# Patient Record
(demographics unavailable — no encounter records)

---

## 2024-10-30 NOTE — ASSESSMENT
[FreeTextEntry1] : Venipuncture done in our office, Labs sent out.Patient prescribed Augmentin X 10 days/Astelin/cont flonase, consider ENT eval due to recurrent issue  .Patient advised to rest/increase fluids and use supportive therapy. Patient will call if symptoms persist or worsen and return to the office as scheduled for regular followup.   Dr. Diaz was present in office building while I examined patient

## 2024-10-30 NOTE — ASSESSMENT
[FreeTextEntry1] : Patient with known right renal artery aneurysm. Currently asymptomatic. Aneurysm appears to be stable today and unremarkable, measuring around 1.8cm. Explained to the patient that it is imaging dependence and essentially is about the same size as it was in 2018. He does not require intervention at this time.   We will continue with yearly surveillance.

## 2024-10-30 NOTE — HISTORY OF PRESENT ILLNESS
[FreeTextEntry1] : 70-year-old gentleman with known right renal artery aneurysm. I personally reviewed imaging beginning back to 2018 and his aneurysm has been been around 2 cm since. Patient is currently asymptomatic. She is here for yearly surveillance duplex. Pt denies any new complains/pain.

## 2024-10-30 NOTE — PHYSICAL EXAM
[No Acute Distress] : no acute distress [No Respiratory Distress] : no respiratory distress  [Clear to Auscultation] : lungs were clear to auscultation bilaterally [Normal Rate] : normal rate  [Regular Rhythm] : with a regular rhythm [Normal Affect] : the affect was normal [Normal Mood] : the mood was normal [Supple] : supple [de-identified] : Tm's with +fluid, +nasal congestion, +PND [de-identified] : +maxillary pressure

## 2024-10-30 NOTE — HISTORY OF PRESENT ILLNESS
[FreeTextEntry8] : Pt presents for  1.Repeat K, last level was 3.3, abnormal results d/w pt and questions answered  2.Acute on chronic sinus issue, today is terrible, "face hurts", pt c/o sinus pressure, congestion, PND with reactive cough from PND. Denies fever/covid exposure

## 2024-10-30 NOTE — PHYSICAL EXAM
[Abdomen Masses] : No abdominal masses [Abdomen Tenderness] : ~T ~M No abdominal tenderness [de-identified] : Appears well, No distress [de-identified] : Full ROM Upper/Lower Extremities. No evidence of edema

## 2025-01-07 NOTE — HISTORY OF PRESENT ILLNESS
[FreeTextEntry1] : chest pain, and uncontrolled hypertension hypertension and chest pain , aortic aneurysm   HPI for today: :  1 7 2025:   feels good. no chest pain  . no dyspnea on exertion .  no dizizness. no syncope.  compliant wiht meds    old note:  he started taking ubiquinol and his TGs went down.  no chest pain . no dyspnea on exertion . no dizziness.  no dyspnea on exertion  complaitn with med.s   ol dnote:   feels good. no chest pain no syncope,.   +  dizziness  no chest pain , no dyspnea on exertion .    old note:  feels good ,. no chest pain. no headhaces. no dizziness.  feels ./deras after wgoing up 4-5 flights of stairs.    old note:  he feels good. dyspnea on exertion : chronic.  he  follows up with vascular. no syncope.     old note: : : feels ok. no headaches. no dizziness. no headaches. no dizziness. no cehst pain. no headacehs.   old note:  feels good. no chest pain, no headahces. no dyspnea.    old note: feels good. No chest pain. no dyspnea. no headaches. no plaptiations. no dizziness.    old note: complains of dyspnea on exertion. worse when he is moving fast or lifting stuff Some flutterin gof the chest . intermittent.  No chest pain. no dizziness. No syncope.

## 2025-01-07 NOTE — PHYSICAL EXAM
[General Appearance - Well Developed] : well developed [Normal Appearance] : normal appearance [Well Groomed] : well groomed [General Appearance - Well Nourished] : well nourished [No Deformities] : no deformities [General Appearance - In No Acute Distress] : no acute distress [Normal Conjunctiva] : the conjunctiva exhibited no abnormalities [Eyelids - No Xanthelasma] : the eyelids demonstrated no xanthelasmas [Normal Oral Mucosa] : normal oral mucosa [No Oral Pallor] : no oral pallor [No Oral Cyanosis] : no oral cyanosis [Normal Jugular Venous A Waves Present] : normal jugular venous A waves present [Normal Jugular Venous V Waves Present] : normal jugular venous V waves present [No Jugular Venous Murray A Waves] : no jugular venous murray A waves [Respiration, Rhythm And Depth] : normal respiratory rhythm and effort [Exaggerated Use Of Accessory Muscles For Inspiration] : no accessory muscle use [Heart Rate And Rhythm] : heart rate and rhythm were normal [Auscultation Breath Sounds / Voice Sounds] : lungs were clear to auscultation bilaterally [Heart Sounds] : normal S1 and S2 [Murmurs] : no murmurs present [Abdomen Soft] : soft [Abdomen Tenderness] : non-tender [Abdomen Mass (___ Cm)] : no abdominal mass palpated [Abnormal Walk] : normal gait [Gait - Sufficient For Exercise Testing] : the gait was sufficient for exercise testing [Nail Clubbing] : no clubbing of the fingernails [Cyanosis, Localized] : no localized cyanosis [Petechial Hemorrhages (___cm)] : no petechial hemorrhages [Skin Color & Pigmentation] : normal skin color and pigmentation [] : no rash [No Venous Stasis] : no venous stasis [Skin Lesions] : no skin lesions [No Skin Ulcers] : no skin ulcer [No Xanthoma] : no  xanthoma was observed [Oriented To Time, Place, And Person] : oriented to person, place, and time [Affect] : the affect was normal [Mood] : the mood was normal [No Anxiety] : not feeling anxious

## 2025-01-07 NOTE — DISCUSSION/SUMMARY
[Patient] : the patient [Risks] : risks [Benefits] : benefits [Alternatives] : alternatives [With Me] : with me [___ Year(s)] : in [unfilled] year(s) [FreeTextEntry1] : this is a 70 year old male with aortic aneurysm, hypertension, here with chest pain, dizzienss and chest pain 1) coronary artery disease evaluation:  normal evaln.  2) dyspnea on exertion : rfesovled.  2) hypertension  controlled. diet and exercise. ct diltiazem 360 mg Daily. Fosinopril 20 Q12.  ct chlorthalidone 25 mg daily.  3) Ascending aortic aneurysm 4.5 cm. trileaflet valve. no family history of. Cut off 5.5 cm.   repeat transthoracic echocardiogram  CT angio aorta. and abdomen  4) Renal artery aneurysm: F/u vascular surgery Dr vázquez following up  5) Atherosclerosis of the carotid artery and ECA disease:  Crestor . 40 mg daily. .  LDL 71.   Will order and review ECG for the above mentioned diagnosis/condition/symptoms    [EKG obtained to assist in diagnosis and management of assessed problem(s)] : EKG obtained to assist in diagnosis and management of assessed problem(s)

## 2025-01-07 NOTE — CARDIOLOGY SUMMARY
[No Ischemia] : no Ischemia [No Symptoms] : no Symptoms [___] : [unfilled] [LVEF ___%] : LVEF [unfilled]% [Normal] : normal LA size [None] : no mitral regurgitation [de-identified] : 1 7 2025:   12 29 2023:Sinus Rhythm  WITHIN NORMAL LIMITS   3 9 2022 Sinus  Rhythm  WITHIN NORMAL LIMITS  6/9/2021   Sinus  Rhythm  WITHIN NORMAL LIMITS  [de-identified] : Feb 2024:  LVEF:  60% no significant valvular abnormality .   AScending aorta 4.6  oc t2022:  LVEF 60%.   mild LVH.  .  mild dialted ascneding aorta  dec 2020:  Normal wall motion. Lvef 65%.  normal Rv: no significant valvular abnormality . 4.4 cm ascending aorta,.  [de-identified] : june 2021: Nuclear stres./ noischemia. Normal LVEf.  [de-identified] : mar 2021: 4.5 cm ascneding aorta.  \par  \par  Us kidney: 2 cm right renal aneuirysm,.  [de-identified] : opct 2024:  LDL : 62.  HDL: 58.  Total: !46.  Tgs: 160  setp 2023:  TGs: 142.   Total :135.  HDL : 56.  LDL : 54  [de-identified] : feb 2018  [de-identified] : Few SVT beats.  [de-identified] : Moderate athersclerosis without stenosis.  [de-identified] : CTA: ascending aortic anerusym, 4.5 x 4.5 renal arteryaneurysm 2.1 cm. \par  \par  Lipid profile: jan 2019: LDL = 88 ; Tgs 222;  HDL 46

## 2025-02-03 NOTE — COUNSELING
[Potential consequences of obesity discussed] : Potential consequences of obesity discussed [Benefits of weight loss discussed] : Benefits of weight loss discussed [Encouraged to increase physical activity] : Encouraged to increase physical activity [Weight management counseling provided] : Weight management [Healthy eating counseling provided] : healthy eating [Activity counseling provided] : activity [Target Wt Loss Goal ___] : Target weight loss goal [unfilled] lbs [Low Fat Diet] : Low fat diet [Decrease Portions] : Decrease food portions [Low Salt Diet] : Low salt diet [Walking] : Walking [None] : None [Needs reinforcement, provided] : Patient needs reinforcement on understanding lifestyle changes and  the steps needed to achieve self management goals and reinforcement was provided

## 2025-02-04 NOTE — PHYSICAL EXAM
Yes [General Appearance - Alert] : alert [General Appearance - In No Acute Distress] : in no acute distress [Sclera] : the sclera and conjunctiva were normal [PERRL With Normal Accommodation] : pupils were equal in size, round, and reactive to light [Extraocular Movements] : extraocular movements were intact [Outer Ear] : the ears and nose were normal in appearance [Oropharynx] : the oropharynx was normal [Neck Appearance] : the appearance of the neck was normal [Neck Cervical Mass (___cm)] : no neck mass was observed [Jugular Venous Distention Increased] : there was no jugular-venous distention [Thyroid Diffuse Enlargement] : the thyroid was not enlarged [Thyroid Nodule] : there were no palpable thyroid nodules [Auscultation Breath Sounds / Voice Sounds] : lungs were clear to auscultation bilaterally [Heart Rate And Rhythm] : heart rate was normal and rhythm regular [Heart Sounds] : normal S1 and S2 [Heart Sounds Gallop] : no gallops [Murmurs] : no murmurs [Heart Sounds Pericardial Friction Rub] : no pericardial rub [Arterial Pulses Carotid] : carotid pulses were normal with no bruits [Abdominal Aorta] : the abdominal aorta was normal [Arterial Pulses Femoral] : femoral pulses were normal without bruits [Full Pulse] : the pedal pulses are present [Edema] : there was no peripheral edema [Veins - Varicosity Changes] : there were no varicosital changes [Bowel Sounds] : normal bowel sounds [Abdomen Soft] : soft [Abdomen Mass (___ Cm)] : no abdominal mass palpated [Abdomen Hernia] : no hernia was discovered [Cervical Lymph Nodes Enlarged Posterior Bilaterally] : posterior cervical [Cervical Lymph Nodes Enlarged Anterior Bilaterally] : anterior cervical [Supraclavicular Lymph Nodes Enlarged Bilaterally] : supraclavicular [Axillary Lymph Nodes Enlarged Bilaterally] : axillary [Femoral Lymph Nodes Enlarged Bilaterally] : femoral [Inguinal Lymph Nodes Enlarged Bilaterally] : inguinal [No Spinal Tenderness] : no spinal tenderness [Abnormal Walk] : normal gait [Nail Clubbing] : no clubbing  or cyanosis of the fingernails [Musculoskeletal - Swelling] : no joint swelling seen [Motor Tone] : muscle strength and tone were normal [Skin Color & Pigmentation] : normal skin color and pigmentation [Skin Turgor] : normal skin turgor [] : no rash [Cranial Nerves] : cranial nerves 2-12 were intact [No Focal Deficits] : no focal deficits [Oriented To Time, Place, And Person] : oriented to person, place, and time [Impaired Insight] : insight and judgment were intact [Affect] : the affect was normal [FreeTextEntry1] : Prostate checked by urology [Over the Past 2 Weeks, Have You Felt Down, Depressed, or Hopeless?] : 1.) Over the past 2 weeks, have you felt down, depressed, or hopeless? No [Over the Past 2 Weeks, Have You Felt Little Interest or Pleasure Doing Things?] : 2.) Over the past 2 weeks, have you felt little interest or pleasure doing things? No

## 2025-02-04 NOTE — HISTORY OF PRESENT ILLNESS
[FreeTextEntry1] : 70-year-old male with history of hypertension, hyperlipidemia and obesity presents for his yearly physical. Also has prediabetes  The patient's main issue is that he has a right renal artery aneurysm and an ascending  aortic aneurysm Vascular follow-up October 2023 with renal artery aneurysm stable at 1.8 cm. CAT scan of the chest March 2021 showed the aneurysm to be the same at 4.5 cm. Stable pulmonary nodule. He followed up with vascular October 2024 with duplex being stable. Most recently he had CTA of the abdomen and chest showing stable aneurysmal dilatation of the thoracic aorta at 4.7 cm and stable bilateral renal arteries with left at 10 mm and right at 16 mm. Also no pulmonary nodule seen.  Carotid duplex October 2020 with plaque without stenosis.  March 2018 with stress test  Echocardiogram February 2024 with normal LVEF with grade 1 diastolic dysfunction with a ascending aorta at 4.6 cm.  The patient has made some good lifestyle changes with about 10,000 steps daily and dietary changes with 30 pounds of weight loss.  He has maintained his weight loss.  Otherwise no complaints including no chest pain, palpitations, shortness of breath or edema.  He does continue to complain of many years of having recurrent right upper quadrant pain stating that even as a child he had some symptoms. He states he was evaluated in the past. When it occurs it usually postprandial and start in the right upper quadrant and radiates around to his back. No nausea or vomiting. he was given a referral for an abdominal ultrasound last which he did not do any symptoms are minimal.  2023 the patient complained of hematospermia therefore referred to urology who did a cystoscopy February 2023 which is normal.  Continues to follow with urology.  The patient is status post left inguinal hernia repair December 2019 which went well.  Family history is significant for brother diagnosed with prostate cancer and had a prostatectomy  Unfortunately the patient's wife passed away from end-stage heart disease October 2022.  He is coping quite well with good support of his family. He has 4 children and works as a

## 2025-02-04 NOTE — HEALTH RISK ASSESSMENT
[Yes] : Yes [Monthly or less (1 pt)] : Monthly or less (1 point) [1 or 2 (0 pts)] : 1 or 2 (0 points) [Never (0 pts)] : Never (0 points) [No] : In the past 12 months have you used drugs other than those required for medical reasons? No [No falls in past year] : Patient reported no falls in the past year [0] : 2) Feeling down, depressed, or hopeless: Not at all (0) [PHQ-2 Negative - No further assessment needed] : PHQ-2 Negative - No further assessment needed [Former] : Former [20 or more] : 20 or more [> 15 Years] : > 15 Years [None] : None [With Significant Other] : lives with significant other [With Family] : lives with family [# of Members in Household ___] :  household currently consist of [unfilled] member(s) [Employed] : employed [High School] : high school [] :  [# Of Children ___] : has [unfilled] children [Sexually Active] : sexually active [Feels Safe at Home] : Feels safe at home [Fully functional (bathing, dressing, toileting, transferring, walking, feeding)] : Fully functional (bathing, dressing, toileting, transferring, walking, feeding) [Fully functional (using the telephone, shopping, preparing meals, housekeeping, doing laundry, using] : Fully functional and needs no help or supervision to perform IADLs (using the telephone, shopping, preparing meals, housekeeping, doing laundry, using transportation, managing medications and managing finances) [Smoke Detector] : smoke detector [Carbon Monoxide Detector] : carbon monoxide detector [Seat Belt] :  uses seat belt [Reviewed no changes] : Reviewed, no changes [Discussed at today's visit] : Advance Directives Discussed at today's visit [Designated Healthcare Proxy] : Designated healthcare proxy [Name: ___] : Health Care Proxy's Name: [unfilled]  [Relationship: ___] : Relationship: [unfilled] [Very Good] : ~his/her~ current health as very good [NO] : No [Audit-CScore] : 1 [de-identified] : walking [de-identified] : decent [[id number]] : 0 [de-identified] : 1980 [Change in mental status noted] : No change in mental status noted [Reports changes in hearing] : Reports no changes in hearing [Reports changes in vision] : Reports no changes in vision [Reports changes in dental health] : Reports no changes in dental health [Sunscreen] : does not use sunscreen [de-identified] : chito [FreeTextEntry2] :  [de-identified] : glasses [AdvancecareDate] : 02/25

## 2025-02-04 NOTE — ASSESSMENT
[FreeTextEntry1] : 70-year-old male with controlled hypertension and hyperlipidemia on treatment. Patient with prediabetes with good lifestyle changes with diet and exercise with about 30 pounds of weight loss therefore encouraged to continue.  CTA of the chest and abdomen February 2025 shows stable renal artery aneurysm and dilated aortic root and ascending aortic aneurysm. Followup with vascular and cardiology as scheduled.  Patient with family history of prostate cancer in his brother therefore follow-up with urology as scheduled.  PSA to be done.  Otherwise patient with good general health. Appears to be coping quite well after the passing of his wife October 2016.  Colonoscopy December 2018 with followup in 5 years.  Referral given for  Influenza and COVID vaccine already received Other vaccines up-to-date  Venipuncture done today in the office  Followup in 4 months

## 2025-02-17 NOTE — HISTORY OF PRESENT ILLNESS
[de-identified] : 70M presenting with recurrent sinusitis and nasal congestion. He has been on antibiotics twice this year for infections, gets them about 3-4x per year.  He reports daily facial pain/pressure, good sense of smell. He uses flonase and azelastine regularly although has had to stop using flonase since it would cause epistaxis. Using saline spray daily. Breathing through mouth at night especially. Has facial pressure that improves with flonase.

## 2025-02-17 NOTE — PROCEDURE
[Anterior rhinoscopy insufficient to account for symptoms] : anterior rhinoscopy insufficient to account for symptoms [None] : none [Flexible Endoscope] : examined with the flexible endoscope [de-identified] : Darrin Foy [de-identified] : Nasal Endoscopy: Procedure: Bilateral nasal endoscopy (CPT 98828)   Indication: Anterior rhinoscopy was inadequate to evaluate pathology.  Left: Septum deviated broadly left  Moderate inferior turbinate hypertrophy  Middle meatus clear, without masses, polyps, or pus Sphenoethmoidal recess clear, without masses, polyps, or pus  Right:  Septum deviated broadly left  Moderate inferior turbinate hypertrophy Middle meatus clear, without masses, polyps, or pus  Sphenoethmoidal recess clear, without masses, polyps, or pus

## 2025-02-17 NOTE — PLAN
[TextEntry] : We discussed multiple options including starting budesonide irrigations, vivaer or surgery.  We will start with budesonide irrigations BID.  Follow up in 4-6 weeks.

## 2025-02-17 NOTE — CONSULT LETTER
[Dear  ___] : Dear  [unfilled], [Consult Letter:] : I had the pleasure of evaluating your patient, [unfilled]. [Please see my note below.] : Please see my note below. [Consult Closing:] : Thank you very much for allowing me to participate in the care of this patient.  If you have any questions, please do not hesitate to contact me. [Sincerely,] : Sincerely, [FreeTextEntry3] : Darrin Foy MD, KIEL  Otolaryngology  Sinus and Endoscopic Skull Base Surgery  Head and Neck Surgery   [address], [address]  [place] [address]  Tel: [phone]  Fax:[phone]  ELIGIO Kellogg, PA-C Department of Otolaryngology [facility] Otolaryngology at [place] Phone: [phone] Fax: [phone]

## 2025-03-25 NOTE — ASSESSMENT
[FreeTextEntry1] : Patient prescribed Z-Jamir No. 1 as directed, viral swab sent.Patient advised to rest/increase fluids and use supportive therapy. Patient will call if symptoms persist or worsen and return to the office as scheduled for regular followup.   Dr. Diaz was present in office building while I examined patient

## 2025-03-25 NOTE — HISTORY OF PRESENT ILLNESS
[FreeTextEntry8] : Patient presents complaining of not feeling well as of last night.  Patient is complaining of headache/mild sore throat/chills and bodyaches.  Patient has no significant cough, no sick contacts or recent travel.  Patient did not take a COVID test

## 2025-05-06 NOTE — HISTORY OF PRESENT ILLNESS
[de-identified] : Update 5/6/25: f/u recurrent sinusitis, started on budesonide irrigations BID. Reports it has helped somewhat and started OTC antihistamine after start of allergy season.  70M presenting with recurrent sinusitis and nasal congestion. He has been on antibiotics twice this year for infections, gets them about 3-4x per year.  He reports daily facial pain/pressure, good sense of smell. He uses flonase and azelastine regularly although has had to stop using flonase since it would cause epistaxis. Using saline spray daily. Breathing through mouth at night especially. Has facial pressure that improves with flonase.

## 2025-05-06 NOTE — PLAN
[TextEntry] : We discussed multiple options including contiued budesonide irrigations, vivaer or surgery. He has received enough improvement where he would like to continue with budesonide irrigations. He will think about the vivaer procedure.  Will prescribe astelin as well to be used in allergy season.  Follow up PRN.

## 2025-05-06 NOTE — HISTORY OF PRESENT ILLNESS
[de-identified] : Update 5/6/25: f/u recurrent sinusitis, started on budesonide irrigations BID. Reports it has helped somewhat and started OTC antihistamine after start of allergy season.  70M presenting with recurrent sinusitis and nasal congestion. He has been on antibiotics twice this year for infections, gets them about 3-4x per year.  He reports daily facial pain/pressure, good sense of smell. He uses flonase and azelastine regularly although has had to stop using flonase since it would cause epistaxis. Using saline spray daily. Breathing through mouth at night especially. Has facial pressure that improves with flonase.

## 2025-05-06 NOTE — PROCEDURE
[Anterior rhinoscopy insufficient to account for symptoms] : anterior rhinoscopy insufficient to account for symptoms [None] : none [Flexible Endoscope] : examined with the flexible endoscope [de-identified] : Darrin Foy [de-identified] : Nasal Endoscopy: Procedure: Bilateral nasal endoscopy (CPT 35605)   Indication: Anterior rhinoscopy was inadequate to evaluate pathology.  Left: Septum deviated broadly left, thin mucus stranding Moderate inferior turbinate hypertrophy  Middle meatus clear, without masses, polyps, or pus Sphenoethmoidal recess clear, without masses, polyps, or pus  Right:  Septum deviated broadly left, thin mucus stranding Moderate inferior turbinate hypertrophy Middle meatus clear, without masses, polyps, or pus  Sphenoethmoidal recess clear, without masses, polyps, or pus

## 2025-05-06 NOTE — PROCEDURE
[Anterior rhinoscopy insufficient to account for symptoms] : anterior rhinoscopy insufficient to account for symptoms [None] : none [Flexible Endoscope] : examined with the flexible endoscope [de-identified] : Darrin Foy [de-identified] : Nasal Endoscopy: Procedure: Bilateral nasal endoscopy (CPT 26741)   Indication: Anterior rhinoscopy was inadequate to evaluate pathology.  Left: Septum deviated broadly left, thin mucus stranding Moderate inferior turbinate hypertrophy  Middle meatus clear, without masses, polyps, or pus Sphenoethmoidal recess clear, without masses, polyps, or pus  Right:  Septum deviated broadly left, thin mucus stranding Moderate inferior turbinate hypertrophy Middle meatus clear, without masses, polyps, or pus  Sphenoethmoidal recess clear, without masses, polyps, or pus

## 2025-06-10 NOTE — ASSESSMENT
[FreeTextEntry1] : Brain MRI ordered.Venipuncture done in our office,labs sent out. Patient advised to continue present medications with diet/exercise.Patient will return to the office in 4months  Dr. Diaz was present in office building while I examined patient  Colonoscopy 5/2024 with followup in 5 years shingles vaccine d/w pt +got covid vaccines CT of the chest /sonogram per vascular for aneurysm 7/2018 HIV/hepatitis C screening Specialists include 1. Cardiology-Dr. Peters 2. Vascular- 3.GI-Dr. Barry/[facility] 4.orthopedic-[facility] ([facility]) 5.-Dr. Martinez 6.Derm-Zach Bunch 7.ENT=Dr. Foy Echo was done 2/2024 CT lung screen=N/A=patient quit greater than 45 years ago abd sono 1/2021 with +fatty liver.

## 2025-06-10 NOTE — HISTORY OF PRESENT ILLNESS
[FreeTextEntry1] : Patient presents for followup on hypertension/hyperlipidemia/obesity. Patient is currently fasting for today's labs. Patient is currently on cartia/fosinopril/Chlorthalidone for hypertension, on Crestor for his hyperlipidemia and is trying to improve his obesity -c/o "pressure in head on the left" on and off X months, no exacerbating or relieving factors, no extremity weakness/photophobia/nausea/

## 2025-06-10 NOTE — REVIEW OF SYSTEMS
November 13, 2023     Patient: Wally Tong   YOB: 1981   Date of Visit: 11/13/2023       To Whom it May Concern:    Wally Tong was seen in my clinic on 11/13/2023 and may return to work on 11/15/23.    Sincerely,       Diana Velasco CNP    Medical information is confidential and cannot be disclosed without the written consent of the patient or his representative.      
[FreeTextEntry1] : see HPI

## 2025-06-10 NOTE — ASSESSMENT
[FreeTextEntry1] : Brain MRI ordered.Venipuncture done in our office,labs sent out. Patient advised to continue present medications with diet/exercise.Patient will return to the office in 4months  Dr. Diaz was present in office building while I examined patient  Colonoscopy 5/2024 with followup in 5 years shingles vaccine d/w pt +got covid vaccines CT of the chest /sonogram per vascular for aneurysm 7/2018 HIV/hepatitis C screening Specialists include 1. Cardiology-Dr. Peters 2. Vascular- 3.GI-Dr. Barry/[facility] 4.orthopedic-[facility] ([facility]) 5.-Dr. Martinez 6.Derm-Zach Bunch 7.ENT=Dr. Foy Echo was done 2/2024 CT lung screen=N/A=patient quit greater than 45 years ago abd sono 1/2021 with +fatty liver.

## 2025-06-10 NOTE — ASSESSMENT
[FreeTextEntry1] : Brain MRI ordered.Venipuncture done in our office,labs sent out. Patient advised to continue present medications with diet/exercise.Patient will return to the office in 4months  Dr. Diaz was present in office building while I examined patient  Colonoscopy 5/2024 with followup in 5 years shingles vaccine d/w pt +got covid vaccines CT of the chest /sonogram per vascular for aneurysm 7/2018 HIV/hepatitis C screening Specialists include 1. Cardiology-Dr. Peters 2. Vascular- 3.GI-Dr. Barry/[facility] 4.orthopedic-ren[facility] ([facility]) 5.-Dr. Martinez 6.Derm-Zach Bunch 7.ENT=Dr. Foy Echo was done 2/2024 CT lung screen=N/A=patient quit greater than 45 years ago abd sono 1/2021 with +fatty liver.

## 2025-07-02 NOTE — ASSESSMENT
[FreeTextEntry1] : Venipuncture done in our office,labs sent out. Patient advised to continue present medications with diet/exercise.Patient will return to the office as sched for reg check  Colonoscopy 5/2024 with followup in 5 years shingles vaccine d/w pt +got covid vaccines CT of the chest /sonogram per vascular for aneurysm 7/2018 HIV/hepatitis C screening Specialists include 1. Cardiology-Dr. Peters 2. Vascular- 3.GI-Dr. Barry/[facility] 4.orthopedic-[facility] ([facility]) 5.-Dr. Martinez 6.Derm-Zach Bunch 7.ENT=Dr. Foy Echo was done 2/2024 CT lung screen=N/A=patient quit greater than 45 years ago abd sono 1/2021 with +fatty liver.

## 2025-07-02 NOTE — HISTORY OF PRESENT ILLNESS
[FreeTextEntry1] : Patient presents for followup -Patient is currently on cartia/fosinopril/Chlorthalidone for hypertension -last labs showed K low at 3.4, abnormal results d/w pt and questions answered